# Patient Record
Sex: FEMALE | URBAN - METROPOLITAN AREA
[De-identification: names, ages, dates, MRNs, and addresses within clinical notes are randomized per-mention and may not be internally consistent; named-entity substitution may affect disease eponyms.]

---

## 2021-08-06 PROBLEM — F32.A ANXIETY AND DEPRESSION: Status: ACTIVE | Noted: 2021-08-06

## 2021-08-06 PROBLEM — F41.9 ANXIETY AND DEPRESSION: Status: ACTIVE | Noted: 2021-08-06

## 2021-08-06 PROBLEM — I10 HYPERTENSION: Status: ACTIVE | Noted: 2021-08-06

## 2022-07-21 DIAGNOSIS — M81.0 AGE-RELATED OSTEOPOROSIS WITHOUT CURRENT PATHOLOGICAL FRACTURE: Primary | ICD-10-CM

## 2022-07-21 DIAGNOSIS — Z87.81 HISTORY OF COMPRESSION FRACTURE OF SPINE: ICD-10-CM

## 2022-07-21 RX ORDER — ZOLEDRONIC ACID 5 MG/100ML
5 INJECTION, SOLUTION INTRAVENOUS ONCE
OUTPATIENT
Start: 2022-07-21

## 2022-08-05 ENCOUNTER — TELEPHONE (OUTPATIENT)
Dept: HEMATOLOGY/ONCOLOGY | Facility: HOSPITAL | Age: 82
End: 2022-08-05

## 2022-08-05 NOTE — TELEPHONE ENCOUNTER
Called patient no answer left message to give a call back to schedule Reclast infusion that was order by Dr Nolvia Villarreal.

## 2022-08-11 ENCOUNTER — LAB ENCOUNTER (OUTPATIENT)
Dept: LAB | Facility: HOSPITAL | Age: 82
End: 2022-08-11
Attending: PHYSICAL MEDICINE & REHABILITATION
Payer: MEDICARE

## 2022-08-11 DIAGNOSIS — Z87.81 HISTORY OF COMPRESSION FRACTURE OF SPINE: ICD-10-CM

## 2022-08-11 DIAGNOSIS — M81.0 AGE-RELATED OSTEOPOROSIS WITHOUT CURRENT PATHOLOGICAL FRACTURE: ICD-10-CM

## 2022-08-11 LAB
ALBUMIN SERPL-MCNC: 3.4 G/DL (ref 3.4–5)
CALCIUM BLD-MCNC: 9.1 MG/DL (ref 8.5–10.1)
CREAT BLD-MCNC: 0.95 MG/DL
GFR SERPLBLD BASED ON 1.73 SQ M-ARVRAT: 60 ML/MIN/1.73M2 (ref 60–?)

## 2022-08-11 PROCEDURE — 82565 ASSAY OF CREATININE: CPT

## 2022-08-11 PROCEDURE — 36415 COLL VENOUS BLD VENIPUNCTURE: CPT

## 2022-08-11 PROCEDURE — 82310 ASSAY OF CALCIUM: CPT

## 2022-08-11 PROCEDURE — 82040 ASSAY OF SERUM ALBUMIN: CPT

## 2022-08-26 ENCOUNTER — OFFICE VISIT (OUTPATIENT)
Dept: HEMATOLOGY/ONCOLOGY | Facility: HOSPITAL | Age: 82
End: 2022-08-26
Attending: PHYSICAL MEDICINE & REHABILITATION
Payer: MEDICARE

## 2022-08-26 VITALS
DIASTOLIC BLOOD PRESSURE: 56 MMHG | HEART RATE: 69 BPM | SYSTOLIC BLOOD PRESSURE: 147 MMHG | TEMPERATURE: 98 F | RESPIRATION RATE: 18 BRPM

## 2022-08-26 DIAGNOSIS — M81.0 AGE-RELATED OSTEOPOROSIS WITHOUT CURRENT PATHOLOGICAL FRACTURE: Primary | ICD-10-CM

## 2022-08-26 DIAGNOSIS — Z87.81 HISTORY OF COMPRESSION FRACTURE OF SPINE: ICD-10-CM

## 2022-08-26 PROCEDURE — 96365 THER/PROPH/DIAG IV INF INIT: CPT

## 2022-08-26 RX ORDER — ZOLEDRONIC ACID 5 MG/100ML
5 INJECTION, SOLUTION INTRAVENOUS ONCE
Status: CANCELLED | OUTPATIENT
Start: 2022-08-26

## 2022-08-26 RX ORDER — ZOLEDRONIC ACID 5 MG/100ML
5 INJECTION, SOLUTION INTRAVENOUS ONCE
Status: COMPLETED | OUTPATIENT
Start: 2022-08-26 | End: 2022-08-26

## 2022-08-26 RX ORDER — ZOLEDRONIC ACID 5 MG/100ML
INJECTION, SOLUTION INTRAVENOUS
Status: COMPLETED
Start: 2022-08-26 | End: 2022-08-26

## 2022-08-26 RX ADMIN — ZOLEDRONIC ACID 5 MG: 5 INJECTION, SOLUTION INTRAVENOUS at 13:55:00

## 2022-08-26 NOTE — PROGRESS NOTES
Lila Janette to infusion area today for reclast. She is accompanied by a family member. Procedure explained and reviewed potential side effects. Pt given handout on the drug. Denies any recent or future dental work. Encouraged to drink extra fluids today. PIV started, good blood return. Reclast given and tolerated well. PIV removed,no redness or swelling at site.   Discharged home and will follow up with her MD.